# Patient Record
Sex: MALE | Race: WHITE | NOT HISPANIC OR LATINO | Employment: OTHER | URBAN - METROPOLITAN AREA
[De-identification: names, ages, dates, MRNs, and addresses within clinical notes are randomized per-mention and may not be internally consistent; named-entity substitution may affect disease eponyms.]

---

## 2023-05-29 ENCOUNTER — HOSPITAL ENCOUNTER (EMERGENCY)
Facility: CLINIC | Age: 48
Discharge: HOME | End: 2023-05-30
Attending: EMERGENCY MEDICINE | Admitting: EMERGENCY MEDICINE

## 2023-05-29 DIAGNOSIS — M71.161 SEPTIC PREPATELLAR BURSITIS OF RIGHT KNEE: ICD-10-CM

## 2023-05-29 LAB
ANION GAP SERPL CALCULATED.3IONS-SCNC: 11 MMOL/L (ref 7–15)
BASOPHILS # BLD AUTO: 0.1 10E3/UL (ref 0–0.2)
BASOPHILS NFR BLD AUTO: 1 %
BUN SERPL-MCNC: 16 MG/DL (ref 6–20)
CALCIUM SERPL-MCNC: 9.1 MG/DL (ref 8.6–10)
CHLORIDE SERPL-SCNC: 102 MMOL/L (ref 98–107)
CREAT SERPL-MCNC: 1.16 MG/DL (ref 0.67–1.17)
DEPRECATED HCO3 PLAS-SCNC: 22 MMOL/L (ref 22–29)
EOSINOPHIL # BLD AUTO: 0.1 10E3/UL (ref 0–0.7)
EOSINOPHIL NFR BLD AUTO: 1 %
ERYTHROCYTE [DISTWIDTH] IN BLOOD BY AUTOMATED COUNT: 12.1 % (ref 10–15)
GFR SERPL CREATININE-BSD FRML MDRD: 79 ML/MIN/1.73M2
GLUCOSE SERPL-MCNC: 132 MG/DL (ref 70–99)
HCT VFR BLD AUTO: 43.3 % (ref 40–53)
HGB BLD-MCNC: 15.5 G/DL (ref 13.3–17.7)
IMM GRANULOCYTES # BLD: 0 10E3/UL
IMM GRANULOCYTES NFR BLD: 0 %
LACTATE SERPL-SCNC: 0.8 MMOL/L (ref 0.7–2)
LYMPHOCYTES # BLD AUTO: 1.9 10E3/UL (ref 0.8–5.3)
LYMPHOCYTES NFR BLD AUTO: 19 %
MCH RBC QN AUTO: 30.9 PG (ref 26.5–33)
MCHC RBC AUTO-ENTMCNC: 35.8 G/DL (ref 31.5–36.5)
MCV RBC AUTO: 86 FL (ref 78–100)
MONOCYTES # BLD AUTO: 1.5 10E3/UL (ref 0–1.3)
MONOCYTES NFR BLD AUTO: 15 %
NEUTROPHILS # BLD AUTO: 6.3 10E3/UL (ref 1.6–8.3)
NEUTROPHILS NFR BLD AUTO: 64 %
NRBC # BLD AUTO: 0 10E3/UL
NRBC BLD AUTO-RTO: 0 /100
PLATELET # BLD AUTO: 221 10E3/UL (ref 150–450)
POTASSIUM SERPL-SCNC: 4 MMOL/L (ref 3.4–5.3)
RBC # BLD AUTO: 5.02 10E6/UL (ref 4.4–5.9)
SODIUM SERPL-SCNC: 135 MMOL/L (ref 136–145)
WBC # BLD AUTO: 9.8 10E3/UL (ref 4–11)

## 2023-05-29 PROCEDURE — 85025 COMPLETE CBC W/AUTO DIFF WBC: CPT | Performed by: EMERGENCY MEDICINE

## 2023-05-29 PROCEDURE — 36415 COLL VENOUS BLD VENIPUNCTURE: CPT | Performed by: EMERGENCY MEDICINE

## 2023-05-29 PROCEDURE — 80048 BASIC METABOLIC PNL TOTAL CA: CPT | Performed by: EMERGENCY MEDICINE

## 2023-05-29 PROCEDURE — 83605 ASSAY OF LACTIC ACID: CPT | Performed by: EMERGENCY MEDICINE

## 2023-05-29 PROCEDURE — 99284 EMERGENCY DEPT VISIT MOD MDM: CPT | Mod: 25

## 2023-05-29 PROCEDURE — 96367 TX/PROPH/DG ADDL SEQ IV INF: CPT

## 2023-05-29 PROCEDURE — 96375 TX/PRO/DX INJ NEW DRUG ADDON: CPT

## 2023-05-29 PROCEDURE — 96365 THER/PROPH/DIAG IV INF INIT: CPT

## 2023-05-29 RX ORDER — MUPIROCIN 20 MG/G
OINTMENT TOPICAL 3 TIMES DAILY
COMMUNITY

## 2023-05-30 VITALS
RESPIRATION RATE: 18 BRPM | TEMPERATURE: 98.9 F | HEART RATE: 86 BPM | BODY MASS INDEX: 29.52 KG/M2 | DIASTOLIC BLOOD PRESSURE: 64 MMHG | WEIGHT: 230 LBS | SYSTOLIC BLOOD PRESSURE: 128 MMHG | OXYGEN SATURATION: 98 % | HEIGHT: 74 IN

## 2023-05-30 PROCEDURE — 250N000011 HC RX IP 250 OP 636: Performed by: EMERGENCY MEDICINE

## 2023-05-30 RX ORDER — CLINDAMYCIN PHOSPHATE 600 MG/50ML
600 INJECTION, SOLUTION INTRAVENOUS ONCE
Status: COMPLETED | OUTPATIENT
Start: 2023-05-30 | End: 2023-05-30

## 2023-05-30 RX ORDER — CLINDAMYCIN HCL 300 MG
600 CAPSULE ORAL 3 TIMES DAILY
Qty: 30 CAPSULE | Refills: 0 | Status: SHIPPED | OUTPATIENT
Start: 2023-05-30 | End: 2023-06-04

## 2023-05-30 RX ORDER — CEFAZOLIN SODIUM 2 G/100ML
2 INJECTION, SOLUTION INTRAVENOUS ONCE
Status: COMPLETED | OUTPATIENT
Start: 2023-05-30 | End: 2023-05-30

## 2023-05-30 RX ORDER — KETOROLAC TROMETHAMINE 15 MG/ML
15 INJECTION, SOLUTION INTRAMUSCULAR; INTRAVENOUS ONCE
Status: COMPLETED | OUTPATIENT
Start: 2023-05-30 | End: 2023-05-30

## 2023-05-30 RX ADMIN — CLINDAMYCIN PHOSPHATE 600 MG: 600 INJECTION, SOLUTION INTRAVENOUS at 01:21

## 2023-05-30 RX ADMIN — KETOROLAC TROMETHAMINE 15 MG: 15 INJECTION, SOLUTION INTRAMUSCULAR; INTRAVENOUS at 00:52

## 2023-05-30 RX ADMIN — CEFAZOLIN SODIUM 2 G: 2 INJECTION, SOLUTION INTRAVENOUS at 00:52

## 2023-05-30 ASSESSMENT — ACTIVITIES OF DAILY LIVING (ADL)
ADLS_ACUITY_SCORE: 35
ADLS_ACUITY_SCORE: 35

## 2023-05-30 NOTE — ED TRIAGE NOTES
[facility]  ED Arrival Note    Arrived to triage c/o Knee pain, swelling and redness. Pt states that he recently fell onto his right knee and had wound. Pt was seen at  1 day ago and started on Keflex and Mupirocin for infection. Pt states that the perimeter redness is extending further and he is having intermittent fever and chills.      Visitors during triage: None      Triage Interventions: IV and labs    Ambulatory: Yes    Directed to: Main ED    Pronouns: he/him       Triage Assessment     Row Name 05/29/23 5975       Triage Assessment (Adult)    Airway WDL WDL       Respiratory WDL    Respiratory WDL WDL       Skin Circulation/Temperature WDL    Skin Circulation/Temperature WDL X;temperature     Skin Temperature warm        Cardiac WDL    Cardiac WDL WDL       Cognitive/Neuro/Behavioral WDL    Cognitive/Neuro/Behavioral WDL WDL

## 2023-05-30 NOTE — ED PROVIDER NOTES
"  History     Chief Complaint:  Knee Pain and Wound Check       The history is provided by the patient.      Jakob Payne is a 48 year old male who presents with right knee pain and wound check. Patient reports he recently fell onto his right knee in a parking lot and developed a wound with puss. He reports he was seen in urgent care yesterday and was put on antibiotics. He reports his knee pain, swelling and redness has spread all over his knee as well. He says it is very difficult to walk on. Patient reports low grade fevers and says his highest temperature was 101. Patient reports aches and chills as well. Patient notes he had 4 doses of Keflex since it was started. He believes his Tetanus shot is up to date within the last 5 years.       Independent Historian:   None - Patient Only    Review of External Notes:    Reviewed urgent care note from 5/28/23.       Medications:    Keflex    Past Medical History:    The patient denies any significant past medical history.       Physical Exam     Patient Vitals for the past 24 hrs:   BP Temp Temp src Pulse Resp SpO2 Height Weight   05/30/23 0315 128/64 -- -- -- -- 98 % -- --   05/30/23 0200 116/80 -- -- 86 18 98 % -- --   05/30/23 0052 -- -- -- -- 20 -- -- --   05/29/23 2218 124/82 98.9  F (37.2  C) Oral 90 20 99 % 1.88 m (6' 2\") 104.3 kg (230 lb)        Physical Exam  Nursing note and vitals reviewed.  Head:  The scalp, face, and head appear normal  Eyes:  Conjunctivae are normal  ENT:    The nose is normal    Pinnae are normal  Neck:  Trachea midline  CV:  Normal rate, regular rhythm. DP pulses normal.   Resp:  No respiratory distress   Musc:  Normal muscular tone    Right knee with erythema swelling anteriorly. Scab to anterior patella. Focal pain over prepatella bursa. Erythema extends laterally, but not circumferentially.    ROM to 90 degrees without significant pain.   Skin:  No rash or lesions noted  Neuro: Speech is normal and fluent. Face is symmetric. " Moving all extremities well.     Emergency Department Course     Laboratory:  Labs Ordered and Resulted from Time of ED Arrival to Time of ED Departure   BASIC METABOLIC PANEL - Abnormal       Result Value    Sodium 135 (*)     Potassium 4.0      Chloride 102      Carbon Dioxide (CO2) 22      Anion Gap 11      Urea Nitrogen 16.0      Creatinine 1.16      Calcium 9.1      Glucose 132 (*)     GFR Estimate 79     CBC WITH PLATELETS AND DIFFERENTIAL - Abnormal    WBC Count 9.8      RBC Count 5.02      Hemoglobin 15.5      Hematocrit 43.3      MCV 86      MCH 30.9      MCHC 35.8      RDW 12.1      Platelet Count 221      % Neutrophils 64      % Lymphocytes 19      % Monocytes 15      % Eosinophils 1      % Basophils 1      % Immature Granulocytes 0      NRBCs per 100 WBC 0      Absolute Neutrophils 6.3      Absolute Lymphocytes 1.9      Absolute Monocytes 1.5 (*)     Absolute Eosinophils 0.1      Absolute Basophils 0.1      Absolute Immature Granulocytes 0.0      Absolute NRBCs 0.0     LACTIC ACID WHOLE BLOOD - Normal    Lactic Acid 0.8          Emergency Department Course & Assessments:             Interventions:  Medications   ceFAZolin (ANCEF) 2 g in 100 mL D5W intermittent infusion (0 g Intravenous Stopped 5/30/23 0127)   clindamycin (CLEOCIN) 600 mg in 50 mL D5W intermittent infusion (0 mg Intravenous Stopped 5/30/23 0157)   ketorolac (TORADOL) injection 15 mg (15 mg Intravenous $Given 5/30/23 0052)        Assessments:  0027 I obtained history and examined the patient as noted above.   0348 At this point I feel that the patient is safe for discharge, and the patient agrees.     Independent Interpretation (X-rays, CTs, rhythm strip):  None    Consultations/Discussion of Management or Tests:  None        Social Determinants of Health affecting care:   None    Disposition:  The patient was discharged to home.     Impression & Plan      Medical Decision Making:  Pt presents with CC right knee pain redness/swelling. He  has been on antibiotics for about 24 hours without significant improvement of redness, but pain is improved. Certainly, no evidence of septic arthritis given weight-bearing, redness only anteriorly, and break in skin anteriorly. Most likely represents septic bursitis vs cellulitis. No fluctuance suggestive of abscess, though pt has noted intermittent purulent drainage. Bursa may need drainage, but would defer to orthopedics. Pt received ancef and clinda IV, and we'll add clindamycin to his outpt therapy. Encouraged close orthopedic follow-up in the next couple days for reassessment. Discussed indications for ED return and hospitalization. He is in stable condition at the time of discharge, indications for return to the ED were discussed as well as follow up. All questions were answered and he is in agreement with the plan.        Diagnosis:    ICD-10-CM    1. Septic prepatellar bursitis of right knee  M71.161            Discharge Medications:  Discharge Medication List as of 5/30/2023  4:09 AM      START taking these medications    Details   clindamycin (CLEOCIN) 300 MG capsule Take 2 capsules (600 mg) by mouth 3 times daily for 5 days, Disp-30 capsule, R-0, E-Prescribe                Scribe Disclosure:  STALIN MCDERMOTT PRINCESS, am serving as a scribe at 11:59 PM on 5/29/2023 to document services personally performed by Dirk Abreu MD based on my observations and the provider's statements to me.        Dirk Abreu MD  05/30/23 1925

## 2023-05-30 NOTE — DISCHARGE INSTRUCTIONS
Continue the keflex. Start clindamycin.    Follow-up with orthopedics before the end of the week. Both [facility] and [facility] have orthopedic urgent care if necessary - call first to see if they can see you in clinic.    Return to emergency department for worsening pain, redness, fevers, or for any other concerns.